# Patient Record
Sex: FEMALE | Race: BLACK OR AFRICAN AMERICAN | NOT HISPANIC OR LATINO | URBAN - METROPOLITAN AREA
[De-identification: names, ages, dates, MRNs, and addresses within clinical notes are randomized per-mention and may not be internally consistent; named-entity substitution may affect disease eponyms.]

---

## 2021-05-06 ENCOUNTER — EMERGENCY (EMERGENCY)
Facility: HOSPITAL | Age: 36
LOS: 1 days | Discharge: HOME | End: 2021-05-06
Admitting: EMERGENCY MEDICINE
Payer: COMMERCIAL

## 2021-05-06 VITALS
RESPIRATION RATE: 16 BRPM | OXYGEN SATURATION: 99 % | SYSTOLIC BLOOD PRESSURE: 107 MMHG | DIASTOLIC BLOOD PRESSURE: 73 MMHG | HEART RATE: 86 BPM | TEMPERATURE: 98 F

## 2021-05-06 VITALS
HEART RATE: 82 BPM | RESPIRATION RATE: 16 BRPM | DIASTOLIC BLOOD PRESSURE: 71 MMHG | WEIGHT: 158.07 LBS | OXYGEN SATURATION: 99 % | HEIGHT: 68 IN | TEMPERATURE: 98 F | SYSTOLIC BLOOD PRESSURE: 106 MMHG

## 2021-05-06 DIAGNOSIS — D25.9 LEIOMYOMA OF UTERUS, UNSPECIFIED: ICD-10-CM

## 2021-05-06 DIAGNOSIS — R10.2 PELVIC AND PERINEAL PAIN: ICD-10-CM

## 2021-05-06 LAB
ALBUMIN SERPL ELPH-MCNC: 3.8 G/DL — SIGNIFICANT CHANGE UP (ref 3.4–5)
ALP SERPL-CCNC: 63 U/L — SIGNIFICANT CHANGE UP (ref 40–120)
ALT FLD-CCNC: 29 U/L — SIGNIFICANT CHANGE UP (ref 12–42)
ANION GAP SERPL CALC-SCNC: 6 MMOL/L — LOW (ref 9–16)
APPEARANCE UR: CLEAR — SIGNIFICANT CHANGE UP
AST SERPL-CCNC: 16 U/L — SIGNIFICANT CHANGE UP (ref 15–37)
BACTERIA # UR AUTO: PRESENT /HPF
BASOPHILS # BLD AUTO: 0.14 K/UL — SIGNIFICANT CHANGE UP (ref 0–0.2)
BASOPHILS NFR BLD AUTO: 1.3 % — SIGNIFICANT CHANGE UP (ref 0–2)
BILIRUB SERPL-MCNC: 0.2 MG/DL — SIGNIFICANT CHANGE UP (ref 0.2–1.2)
BILIRUB UR-MCNC: NEGATIVE — SIGNIFICANT CHANGE UP
BUN SERPL-MCNC: 10 MG/DL — SIGNIFICANT CHANGE UP (ref 7–23)
CALCIUM SERPL-MCNC: 9 MG/DL — SIGNIFICANT CHANGE UP (ref 8.5–10.5)
CHLORIDE SERPL-SCNC: 105 MMOL/L — SIGNIFICANT CHANGE UP (ref 96–108)
CO2 SERPL-SCNC: 28 MMOL/L — SIGNIFICANT CHANGE UP (ref 22–31)
COLOR SPEC: YELLOW — SIGNIFICANT CHANGE UP
CREAT SERPL-MCNC: 0.73 MG/DL — SIGNIFICANT CHANGE UP (ref 0.5–1.3)
DIFF PNL FLD: ABNORMAL
EOSINOPHIL # BLD AUTO: 0.42 K/UL — SIGNIFICANT CHANGE UP (ref 0–0.5)
EOSINOPHIL NFR BLD AUTO: 4 % — SIGNIFICANT CHANGE UP (ref 0–6)
EPI CELLS # UR: ABNORMAL /HPF (ref 0–5)
GLUCOSE SERPL-MCNC: 98 MG/DL — SIGNIFICANT CHANGE UP (ref 70–99)
GLUCOSE UR QL: NEGATIVE — SIGNIFICANT CHANGE UP
HCG SERPL-ACNC: <1 MIU/ML — SIGNIFICANT CHANGE UP
HCT VFR BLD CALC: 34.9 % — SIGNIFICANT CHANGE UP (ref 34.5–45)
HGB BLD-MCNC: 11.1 G/DL — LOW (ref 11.5–15.5)
IMM GRANULOCYTES NFR BLD AUTO: 0.5 % — SIGNIFICANT CHANGE UP (ref 0–1.5)
KETONES UR-MCNC: NEGATIVE — SIGNIFICANT CHANGE UP
LEUKOCYTE ESTERASE UR-ACNC: ABNORMAL
LYMPHOCYTES # BLD AUTO: 29.3 % — SIGNIFICANT CHANGE UP (ref 13–44)
LYMPHOCYTES # BLD AUTO: 3.06 K/UL — SIGNIFICANT CHANGE UP (ref 1–3.3)
MCHC RBC-ENTMCNC: 28.2 PG — SIGNIFICANT CHANGE UP (ref 27–34)
MCHC RBC-ENTMCNC: 31.8 GM/DL — LOW (ref 32–36)
MCV RBC AUTO: 88.8 FL — SIGNIFICANT CHANGE UP (ref 80–100)
MONOCYTES # BLD AUTO: 0.79 K/UL — SIGNIFICANT CHANGE UP (ref 0–0.9)
MONOCYTES NFR BLD AUTO: 7.6 % — SIGNIFICANT CHANGE UP (ref 2–14)
NEUTROPHILS # BLD AUTO: 5.99 K/UL — SIGNIFICANT CHANGE UP (ref 1.8–7.4)
NEUTROPHILS NFR BLD AUTO: 57.3 % — SIGNIFICANT CHANGE UP (ref 43–77)
NITRITE UR-MCNC: NEGATIVE — SIGNIFICANT CHANGE UP
NRBC # BLD: 0 /100 WBCS — SIGNIFICANT CHANGE UP (ref 0–0)
PH UR: 5.5 — SIGNIFICANT CHANGE UP (ref 5–8)
PLATELET # BLD AUTO: 316 K/UL — SIGNIFICANT CHANGE UP (ref 150–400)
POTASSIUM SERPL-MCNC: 4.3 MMOL/L — SIGNIFICANT CHANGE UP (ref 3.5–5.3)
POTASSIUM SERPL-SCNC: 4.3 MMOL/L — SIGNIFICANT CHANGE UP (ref 3.5–5.3)
PROT SERPL-MCNC: 7.8 G/DL — SIGNIFICANT CHANGE UP (ref 6.4–8.2)
PROT UR-MCNC: NEGATIVE MG/DL — SIGNIFICANT CHANGE UP
RBC # BLD: 3.93 M/UL — SIGNIFICANT CHANGE UP (ref 3.8–5.2)
RBC # FLD: 14.2 % — SIGNIFICANT CHANGE UP (ref 10.3–14.5)
RBC CASTS # UR COMP ASSIST: < 5 /HPF — SIGNIFICANT CHANGE UP
SODIUM SERPL-SCNC: 139 MMOL/L — SIGNIFICANT CHANGE UP (ref 132–145)
SP GR SPEC: 1.02 — SIGNIFICANT CHANGE UP (ref 1–1.03)
UROBILINOGEN FLD QL: 0.2 E.U./DL — SIGNIFICANT CHANGE UP
WBC # BLD: 10.45 K/UL — SIGNIFICANT CHANGE UP (ref 3.8–10.5)
WBC # FLD AUTO: 10.45 K/UL — SIGNIFICANT CHANGE UP (ref 3.8–10.5)
WBC UR QL: ABNORMAL /HPF

## 2021-05-06 PROCEDURE — 99285 EMERGENCY DEPT VISIT HI MDM: CPT

## 2021-05-06 PROCEDURE — 76830 TRANSVAGINAL US NON-OB: CPT | Mod: 26

## 2021-05-06 PROCEDURE — 76856 US EXAM PELVIC COMPLETE: CPT | Mod: 26

## 2021-05-06 NOTE — ED PROVIDER NOTE - PATIENT PORTAL LINK FT
You can access the FollowMyHealth Patient Portal offered by [facility] by registering at the following website: [email or web address]. By joining [employer]’s FollowMyHealth portal, you will also be able to view your health information using other applications (apps) compatible with our system.

## 2021-05-06 NOTE — ED PROVIDER NOTE - OBJECTIVE STATEMENT
36 yo no PMHx presents to the ED, sent in by urgent care, c/o L pelvic pain x1.5 weeks. The pain radiates L lower back at times. Pt has not taken any medication for the pain. Pt states yesterday there was a brief, transient episode of extreme pain that gradually domed to the present pain. Pt is sexually active with females only, and has no concerns of STIs; pt was last checked for STI in November. Pt denies fever, chills, CP, cough, SOB, abdominal pain, N/V/D, constipation, dysuria, hematuria, urinary frequency, vaginal discharge. LMP is 4/7/21 and pt does not take exogenous hormone. Pt has an established care with outpatient PCP, who performs her annual gynecology exams. Pt has mild left pelvic tenderness but denies no CVA tenderness.

## 2021-05-06 NOTE — ED PROVIDER NOTE - CHPI ED SYMPTOMS NEG
no chest pain, no cough, no SOB, no abdominal pain, con constipation, no urinary frequency, no vaginal discharge/no diarrhea/no dysuria/no fever/no hematuria/no nausea/no vomiting/no chills

## 2021-05-06 NOTE — ED PROVIDER NOTE - CLINICAL SUMMARY MEDICAL DECISION MAKING FREE TEXT BOX
35y F present to the ED with L pelvic pain that started 1.5 weeks ago. Pt is well appearing, vital sign stable, no acute distress. Labs are unremarkable. UA, urine culture sent, G/C sent. Pt refused empiric treatment for STIs. Transvaginal ultrasound shows an enlarged uterus and two large fibroids and bilateral normal adnexa. Pt symptoms started mid-menstrual cycle and rupture ovarian cyst cannot be excluded. Results discussed with pt. Pt advised and NSAID given for pain control and follow up with gynecologist. Return precaution advised. 35y F present to the ED with L pelvic pain that started 1.5 weeks ago. Pt is well appearing, vital sign stable, no acute distress. Labs are unremarkable. UA, urine culture sent, G/C sent. Pt refused empiric treatment for STIs. Transvaginal ultrasound shows an enlarged uterus and two large fibroids and bilateral normal adnexa. Pt symptoms started mid-menstrual cycle and ruptured ovarian cyst cannot be excluded. Results discussed with pt. Pt advised NSAIDs for pain control and follow up with gynecologist. Return precaution advised.

## 2021-05-06 NOTE — ED PROVIDER NOTE - NSFOLLOWUPINSTRUCTIONS_ED_ALL_ED_FT
FIBROIDS  Uterine fibroids are growths found inside your uterus. Uterine fibroids also may be called tumors or leiomyomas. Uterine fibroids often appear in groups, or you may have only one. They can be small or large, and they can grow. They are almost always benign (not cancer) and likely will not spread to other parts of your body. They may grow when you are pregnant and shrink after you no longer have a monthly period.    Signs and symptoms of uterine fibroids: You may have no signs or symptoms. Symptoms depend on the size, type, and number of fibroids you have. Symptoms also depend on where the fibroids are inside your uterus:  •Heavy or painful menstrual bleeding  •Pelvic pressure and pain  •Increased pelvic pain during sex  •Constipation or pain when you have a bowel movement  •Need to urinate often    Seek care immediately if:   •Your heart begins to race, and you feel faint.  •You begin to pass large blood clots from your vagina.    Call your doctor or gynecologist if:   •Your symptoms, such as heavy bleeding, pain, or pelvic pressure, worsen.  •You feel weak and are more tired than usual.  •You do not feel like your bladder is empty after you urinate. You also may urinate small amounts more often.   •You have questions or concerns about your condition or care.    Treatment: You may not need treatment for your fibroids if you do not have symptoms. The following treatments may shrink your fibroids and help your pain:   •Hormones may help shrink your fibroids.  •Contraceptives help prevent pregnancy. They also may help shrink your fibroids.    •NSAIDs help decrease swelling and pain or fever. This medicine is available with or without a doctor's order. NSAIDs can cause stomach bleeding or kidney problems in certain people. If you take blood thinner medicine, always ask your healthcare provider if NSAIDs are safe for you. Always read the medicine label and follow directions.    •SurgerY may be used to remove your uterine fibroids. Surgery may instead be used to block or slow the flow of blood to the fibroid. This may make your fibroids shrink or disappear. Surgery called a hysterectomy may be needed if your fibroids are severe. For this surgery, your healthcare provider removes your entire uterus. After this surgery, you will no longer be able to have children.    Prevent uterine fibroids:   •Maintain a healthy weight. Extra weight can cause fibroids to grow. Talk to your healthcare provider about a healthy weight for you. He or she can help you create a healthy weight loss plan if you are overweight.    •Eat a variety of healthy foods. Healthy foods include fruits, vegetables, lean meats, fish, low-fat dairy foods, cooked beans, and whole-grain breads and cereals. Fruits and vegetables are especially important for helping lower the risk for fibroids. Your healthcare provider or a dietitian can help you create a healthy meal plan.  Healthy Foods     •Limit or do not drink alcohol as directed. Alcohol can increase your risk for fibroids. A drink of alcohol is 12 ounces of beer, 1½ ounces of liquor, or 5 ounces of wine. Ask your healthcare provider for information if you need help to quit drinking alcohol.    Follow up with your doctor or gynecologist as directed: Write down your questions so you remember to ask them during your visits.

## 2021-05-06 NOTE — ED PROVIDER NOTE - GASTROINTESTINAL NEGATIVE STATEMENT, MLM
L pelvic pain, no abdominal pain, no constipation, no diarrhea, no nausea and no vomiting. L pelvic pain, no constipation, no diarrhea, no nausea and no vomiting.

## 2021-05-07 LAB
C TRACH RRNA SPEC QL NAA+PROBE: SIGNIFICANT CHANGE UP
N GONORRHOEA RRNA SPEC QL NAA+PROBE: SIGNIFICANT CHANGE UP
SPECIMEN SOURCE: SIGNIFICANT CHANGE UP

## 2021-05-08 LAB
CULTURE RESULTS: SIGNIFICANT CHANGE UP
SPECIMEN SOURCE: SIGNIFICANT CHANGE UP

## 2023-01-25 NOTE — ED PROVIDER NOTE - NSCAREINITIATED _GEN_ER
-ACP Only-, .(Attending) Calcipotriene Counseling:  I discussed with the patient the risks of calcipotriene including but not limited to erythema, scaling, itching, and irritation.